# Patient Record
Sex: FEMALE | Race: WHITE | NOT HISPANIC OR LATINO
[De-identification: names, ages, dates, MRNs, and addresses within clinical notes are randomized per-mention and may not be internally consistent; named-entity substitution may affect disease eponyms.]

---

## 2020-11-02 ENCOUNTER — RESULT CHARGE (OUTPATIENT)
Age: 25
End: 2020-11-02

## 2020-11-02 ENCOUNTER — APPOINTMENT (OUTPATIENT)
Dept: PRIMARY CARE | Facility: HOSPITAL | Age: 25
End: 2020-11-02

## 2020-11-02 ENCOUNTER — OUTPATIENT (OUTPATIENT)
Dept: OUTPATIENT SERVICES | Facility: HOSPITAL | Age: 25
LOS: 1 days | End: 2020-11-02

## 2020-11-02 VITALS
SYSTOLIC BLOOD PRESSURE: 101 MMHG | BODY MASS INDEX: 21.26 KG/M2 | TEMPERATURE: 98.2 F | DIASTOLIC BLOOD PRESSURE: 73 MMHG | OXYGEN SATURATION: 100 % | WEIGHT: 120 LBS | HEART RATE: 65 BPM | HEIGHT: 63 IN

## 2020-11-02 LAB
HCG UR QL: NEGATIVE
QUALITY CONTROL: YES

## 2020-11-02 NOTE — HISTORY OF PRESENT ILLNESS
[FreeTextEntry8] : 25 F NKDA with no PMH and no PSH [facility]. \par \par States that she heard the bed alarm and went to the room. She found patient walking to the bathroom holding the bed pan full or urine. Patient accidentally dropped the bed pan on the floor and splashed the urine on her left eye. She immediately flushed her left eye with sterile water > 5 minutes. No itchiness, no eye pain, no blurry vision,no floaters or eye redness. \par \par She works as RN in [employer]. She is updated with her vaccine including Tdap given last July 2020. No chest pain, no chest congestion and not in respiratory distress.

## 2020-11-02 NOTE — HEALTH RISK ASSESSMENT
[No] : No [No falls in past year] : Patient reported no falls in the past year [0] : 2) Feeling down, depressed, or hopeless: Not at all (0) [[id number]] : 0

## 2020-11-09 ENCOUNTER — APPOINTMENT (OUTPATIENT)
Dept: INFECTIOUS DISEASE | Facility: CLINIC | Age: 25
End: 2020-11-09
Payer: COMMERCIAL

## 2020-11-09 VITALS
OXYGEN SATURATION: 100 % | BODY MASS INDEX: 19.49 KG/M2 | SYSTOLIC BLOOD PRESSURE: 100 MMHG | WEIGHT: 110 LBS | HEART RATE: 76 BPM | DIASTOLIC BLOOD PRESSURE: 68 MMHG | TEMPERATURE: 98.4 F | HEIGHT: 63 IN

## 2020-11-09 DIAGNOSIS — Z77.21 CONTACT WITH AND (SUSPECTED) EXPOSURE TO POTENTIALLY HAZARDOUS BODY FLUIDS: ICD-10-CM

## 2020-11-09 DIAGNOSIS — Z29.9 ENCOUNTER FOR PROPHYLACTIC MEASURES, UNSPECIFIED: ICD-10-CM

## 2020-11-09 PROCEDURE — 99072 ADDL SUPL MATRL&STAF TM PHE: CPT

## 2020-11-09 PROCEDURE — 99203 OFFICE O/P NEW LOW 30 MIN: CPT

## 2020-11-09 RX ORDER — RALTEGRAVIR 400 MG/1
400 TABLET, FILM COATED ORAL
Qty: 28 | Refills: 0 | Status: ACTIVE | COMMUNITY
Start: 2020-11-09 | End: 1900-01-01

## 2020-11-09 RX ORDER — EMTRICITABINE AND TENOFOVIR DISOPROXIL FUMARATE 200; 300 MG/1; MG/1
200-300 TABLET, FILM COATED ORAL
Qty: 14 | Refills: 0 | Status: ACTIVE | COMMUNITY
Start: 2020-11-09 | End: 1900-01-01

## 2020-11-09 NOTE — PHYSICAL EXAM
[General Appearance - Alert] : alert [General Appearance - In No Acute Distress] : in no acute distress [Sclera] : the sclera and conjunctiva were normal [Outer Ear] : the ears and nose were normal in appearance [Oropharynx] : the oropharynx was normal with no thrush [Neck Appearance] : the appearance of the neck was normal [] : no respiratory distress [Respiration, Rhythm And Depth] : normal respiratory rhythm and effort [Heart Rate And Rhythm] : heart rate was normal and rhythm regular [Heart Sounds] : normal S1 and S2 [Skin Color & Pigmentation] : normal skin color and pigmentation [No Focal Deficits] : no focal deficits [Oriented To Time, Place, And Person] : oriented to person, place, and time [Affect] : the affect was normal

## 2020-11-09 NOTE — ASSESSMENT
[FreeTextEntry1] : *PEP Management:\par -Rx: Patient given the remaining 2 weeks of the regimen\par -Discussed risks and harms and odds of emmett anything from exposure\par -Discussed safe sex practices until confirmatory test results\par -I discussed follow up appointments going forward\par *Pt to complete 28 day regimen\par *Return 1 week after completion for repeat testing\par *Return at 3 month period and then at the 6 month period\par -All questions answered\par \par *Follow up as directed  [Treatment Education] : treatment education [Risk Reduction] : risk reduction [HIV Education] : HIV Education [Sexuality / Safer Sex] : sexuality/safer sex [Anticipatory Guidance] : anticipatory guidance
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
## 2020-11-16 DIAGNOSIS — Z77.21 CONTACT WITH AND (SUSPECTED) EXPOSURE TO POTENTIALLY HAZARDOUS BODY FLUIDS: ICD-10-CM

## 2021-03-02 ENCOUNTER — EMERGENCY (EMERGENCY)
Facility: HOSPITAL | Age: 26
LOS: 1 days | Discharge: HOME | End: 2021-03-02
Attending: EMERGENCY MEDICINE | Admitting: EMERGENCY MEDICINE
Payer: OTHER MISCELLANEOUS

## 2021-03-02 VITALS
TEMPERATURE: 98 F | SYSTOLIC BLOOD PRESSURE: 108 MMHG | RESPIRATION RATE: 18 BRPM | DIASTOLIC BLOOD PRESSURE: 65 MMHG | OXYGEN SATURATION: 98 % | HEART RATE: 85 BPM

## 2021-03-02 PROCEDURE — 73090 X-RAY EXAM OF FOREARM: CPT | Mod: 26,RT

## 2021-03-02 PROCEDURE — 73060 X-RAY EXAM OF HUMERUS: CPT | Mod: 26,RT

## 2021-03-02 PROCEDURE — 73070 X-RAY EXAM OF ELBOW: CPT | Mod: 26,RT

## 2021-03-02 PROCEDURE — 99284 EMERGENCY DEPT VISIT MOD MDM: CPT
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
## 2021-03-02 NOTE — ED PROVIDER NOTE - NSFOLLOWUPINSTRUCTIONS_ED_ALL_ED_FT
(1) Follow up with your primary care physician as discussed. In addition, we did not find evidence of a life threatening illness on your testing here today, but listed below are the specialists that will be necessary to see as an outpatient to continue the workup.  Please call the numbers listed below or [phone] to set up the necessary appointments.  (2) Immediately seek care at your nearest emergency room if your worsen, persist, or do not resolve   (3) Take Tylenol (up to 1000mg or 1 g)  and/or Motrin (up to 600mg) up to every 6 hours as needed for pain.   (4) If you had an IV (intravenous) line placed, it was removed. Sometimes, after IV removal, that area can be tender for a few days; if it develops redness and swelling, those could be signs of infection; in which case, return to the Emergency Department for assessment. Follow up with your healthcare provider about this issue.
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
## 2021-03-02 NOTE — ED PROVIDER NOTE - PATIENT PORTAL LINK FT
You can access the FollowMyHealth Patient Portal offered by [facility] by registering at the following website: [email or web address]. By joining [facility]’s FollowMyHealth portal, you will also be able to view your health information using other applications (apps) compatible with our system.

## 2021-03-31 ENCOUNTER — APPOINTMENT (OUTPATIENT)
Dept: INTERNAL MEDICINE | Facility: CLINIC | Age: 26
End: 2021-03-31

## 2021-05-05 NOTE — ED PROVIDER NOTE - NS ED ATTENDING STATEMENT MOD
Therapy change made today includes:    Lifestyle Modification Addressed:   Aim for 20 to 30 minutes of exercise 3 to 5 days per week:  Try chair exercises and light weight lifting with dumb bells or soup cans  Limit carbohydrate intake to 45 grams per meal and 15 grams per snack  No sugar sweetened beverages       Discussed Jardiance as it may help with weight loss. Will send message to PCP.    ------------------------------------------------------------------------------------------  The patient was seen for Diabetes Self-Management Education in an individual setting for a diagnosis of diabetes. Time spent with patient was 60 minutes.  Pt referred by Zuri Diaz MD.  Order located in the patient's computer chart.  Reason for Visit: Initial Diabetes Education.  Relevant medical history reviewed.    Pt is at office visit today by self.    Preferences for learning: Visual, repetition, verbal, observation, reading, and demonstration are acceptable to the patient.   Material was presented using verbal, written, demonstration and return demonstration.      Verify assessment form is up to date: completed and reviewed at consult 5/5/21    Latex allergy: No known latex allergy  ------------------------------------------------------------------------------------------------------------  Labs    Hemoglobin A1C: target value and patient current value reviewed 6.9% 2/8/21    Weight  Discussed importance of weight loss with patient for Diabetes management  BMI: Estimated body mass index is 41.12 kg/m² as calculated from the following:    Height as of 4/28/21: 5' 9.5\" (1.765 m).    Weight as of 4/28/21: 128.1 kg.      Blood Glucose Monitoring    Pt is not monitoring and chooses not to begin today.  Advised if A1C goes any higher she must start testing. Pt agrees.     Diabetes Medications  verified  Oral Medications are Glipizide 5 mg 1 tab daily UNSURE if taken as instructed.  Pt states she picked it up, but doesn't think  she has started it yet.      Physical Activity - Incorporating Activity into Lifestyle   Nothing purposeful  OOB with any cardio  Advised seated cair exercises and weight lifting    Food and Nutrition Related History  Pt works: 12:30 pm- 9 pm, M-F  May eat only 1x on weekends  Meal  Content   1st meal Time: 9 am  Food choice: cereal or bonds or oatmeal or rice and eggs  Beverage: [place] tea, ginger ale, water, [place]k almond milk   2nd Meal Time: 2 pm  Food choice: Yest: pork and beans and hot dogs  Beverage: same   3rd Meal Time: 9 am  Food choice: Tater tots or PB and J sandwich or chips  Baked chicken or pork steaks and mixed veggies  Beverage: same; likely Ginger Ale   Snacks/Desserts AM:   PM: sunflower seeds, chips, mini candy bars  Evening: none     Oral Fluids   ETOH: none   Advised NO SUGAR SWEETENED BEVERAGES     Reviewed effects of food on blood sugars  Discussed benefits of balancing meals  Reviewed plate method  Reviewed basic carbohydrate counting  Reviewed label reading    Acute and Chronic Complications  Advised on the potential complications related to uncontrolled diabetes     Other Patient Information  Pt states she had Covid and has memory loss since then.     Discussed readiness to make changes with patient  Patient states:  she/he is ready to make change now   ------------------------------------------------------------------------------------------------------------  Written materials provided were: Diabetes In Control booklet   [employer] Program Handout  Daily Meal Planning Guide  Personal Diabetes Care Record card  Diabetes Daily Record book  Schedule for Diabetes Support Groups  A1C Guide  Pia: Long-Term Complications of Diabetes  Meal Planning - Plate Method     ADA Advice for Emergency Situations  FYWB Insulin Resistance  Planning Healthy Meals  Chair exercises  After Visit Summary (AVS)    Goal Setting to Promote Health & Problem Solving for Daily Living was discussed.    See DM  Care Plan for goals.    Plan:  Report sent to referring provider   Pt to follow up with DM education in a one on one visit.                 Attending with

## 2021-10-04 ENCOUNTER — TRANSCRIPTION ENCOUNTER (OUTPATIENT)
Age: 26
End: 2021-10-04

## 2021-10-04 ENCOUNTER — APPOINTMENT (OUTPATIENT)
Dept: PRIMARY CARE | Facility: HOSPITAL | Age: 26
End: 2021-10-04

## 2021-10-04 ENCOUNTER — OUTPATIENT (OUTPATIENT)
Dept: OUTPATIENT SERVICES | Facility: HOSPITAL | Age: 26
LOS: 1 days | End: 2021-10-04

## 2021-10-04 DIAGNOSIS — J02.9 ACUTE PHARYNGITIS, UNSPECIFIED: ICD-10-CM

## 2021-10-04 PROBLEM — Z78.9 OTHER SPECIFIED HEALTH STATUS: Chronic | Status: ACTIVE | Noted: 2021-03-02

## 2021-10-04 NOTE — PHYSICAL EXAM
[No Acute Distress] : no acute distress [Normal Sclera/Conjunctiva] : normal sclera/conjunctiva [Normal Outer Ear/Nose] : the outer ears and nose were normal in appearance [No Respiratory Distress] : no respiratory distress  [No Accessory Muscle Use] : no accessory muscle use [Clear to Auscultation] : lungs were clear to auscultation bilaterally [Normal Rate] : normal rate  [Regular Rhythm] : with a regular rhythm [No Focal Deficits] : no focal deficits [Normal Gait] : normal gait [Normal Affect] : the affect was normal [Normal Insight/Judgement] : insight and judgment were intact [de-identified] : Rapid Strep was Negative, no tonsular exudates, no tonsular swelling and no tonsular erythema, no maxillary tenderness  [de-identified] : no wheezing, no rhonchi and no crackles

## 2021-10-04 NOTE — HISTORY OF PRESENT ILLNESS
[FreeTextEntry8] : 26F NKDA with no PMH and no PSH [facility] due to complaint of sore throat. \par \par States that she started having sore throat, nasal congestion , dry cough since this morning, 10/4/21. Described as itchy, scratchy associated with dry cough (no mucous or post nasal drip). She came from [place] last week and never had any symptoms since she came back until now. She is fully vaccinated with COVID vaccine since February 2021.   Denies any fever or SOB. No loss of smell or taste, no chest tightness, or any GI related symptoms of nausea, vomiting or diarrhea. \par \par She works as RN at [employer]. She does not take regular maintenance medications. Denies any chest pain, no chest palpitations or any respiratory distress\par \par \par

## 2021-10-05 DIAGNOSIS — J02.9 ACUTE PHARYNGITIS, UNSPECIFIED: ICD-10-CM

## 2021-10-05 LAB — SARS-COV-2 N GENE NPH QL NAA+PROBE: NOT DETECTED

## 2021-10-07 ENCOUNTER — TRANSCRIPTION ENCOUNTER (OUTPATIENT)
Age: 26
End: 2021-10-07

## 2021-10-07 LAB — BACTERIA THROAT CULT: NORMAL

## 2022-07-12 ENCOUNTER — NON-APPOINTMENT (OUTPATIENT)
Age: 27
End: 2022-07-12

## 2023-05-02 ENCOUNTER — APPOINTMENT (OUTPATIENT)
Dept: INTERNAL MEDICINE | Facility: CLINIC | Age: 28
End: 2023-05-02
Payer: COMMERCIAL

## 2023-05-02 VITALS
WEIGHT: 117 LBS | TEMPERATURE: 98.2 F | SYSTOLIC BLOOD PRESSURE: 108 MMHG | BODY MASS INDEX: 20.73 KG/M2 | DIASTOLIC BLOOD PRESSURE: 73 MMHG | HEART RATE: 56 BPM | OXYGEN SATURATION: 99 % | HEIGHT: 63 IN

## 2023-05-02 DIAGNOSIS — Z00.00 ENCOUNTER FOR GENERAL ADULT MEDICAL EXAMINATION W/OUT ABNORMAL FINDINGS: ICD-10-CM

## 2023-05-02 DIAGNOSIS — R53.83 OTHER MALAISE: ICD-10-CM

## 2023-05-02 DIAGNOSIS — R53.81 OTHER MALAISE: ICD-10-CM

## 2023-05-02 DIAGNOSIS — R14.0 ABDOMINAL DISTENSION (GASEOUS): ICD-10-CM

## 2023-05-02 PROCEDURE — 36415 COLL VENOUS BLD VENIPUNCTURE: CPT

## 2023-05-02 PROCEDURE — 99385 PREV VISIT NEW AGE 18-39: CPT | Mod: 25

## 2023-05-02 PROCEDURE — 99213 OFFICE O/P EST LOW 20 MIN: CPT | Mod: 25

## 2023-05-02 NOTE — PHYSICAL EXAM
[de-identified] : Gen: Adult F, NAD\par Head: NC/AT\par EENT: ears grossly normal, PERRL, EOMI, moist mucosa\par Neck: supple\par Chest wall: nontender\par CV: normal s1 +s2, rrr, no murmurs\par Pulm: CTA-B\par Abd: soft, NT, ND\par Skin: no rashes\par Back: no CVA tenderness, no spinal tenderness\par Neuro: gait normal, AAOx3\par Psych: normal affect, normal interaction\par

## 2023-05-02 NOTE — ASSESSMENT
[FreeTextEntry1] : 28yo F seen for CPE\par \par bloating - ?food related\par                          rec food log + meet with nutritionist\par                          before considering eliminating specific foods\par                          rec H pylori check\par fatigue - possibly sleep disorder, hx of mono years ago so post viral syndrome\par               otherwsie it is unclear the cause, she exercises, drinks water, does not work at nights\par CPE today\par Labs today - nonfasting\par UTD with flu shot + covid19 vaccine\par rec ophtho\par sees dental\par rec derm\par sees GYN\par \par

## 2023-05-02 NOTE — HISTORY OF PRESENT ILLNESS
[FreeTextEntry1] : CPE [de-identified] : 28yo F seen for CPE\par today she wants to discuss the following:\par            fatigue -- B12 is low and bloating\par meds: nuvaRIng, B12\par Fam hx: mother \par                father - HLD, heart attack\par                 Pat-GM - ovarian cancer\par                  2 sibings - healthy\par \par eating: food from home more\par eggs/oatmeal\par salad or sandwich\par 1 cup of coffee/occasional alcohol/ no cigarettes\par physical activity - 3x/week- cardio and weight training\par \par sees GYN/dentist/ has not eyes checked\par skin check in the past\par \par covid19 x3\par ill with covid19 in OCt 2021 - unable to smell for one month\par \par denies cp, sob, n/v, diarrhea, no joint pain \par \par Remainder of ROS reviewed and found to be negative.\par

## 2023-05-05 LAB
ALBUMIN SERPL ELPH-MCNC: 4.4 G/DL
ALP BLD-CCNC: 63 U/L
ALT SERPL-CCNC: 15 U/L
ANION GAP SERPL CALC-SCNC: 13 MMOL/L
AST SERPL-CCNC: 19 U/L
BASOPHILS # BLD AUTO: 0.06 K/UL
BASOPHILS NFR BLD AUTO: 0.7 %
BILIRUB SERPL-MCNC: 0.4 MG/DL
BUN SERPL-MCNC: 10 MG/DL
CALCIUM SERPL-MCNC: 9.3 MG/DL
CHLORIDE SERPL-SCNC: 103 MMOL/L
CHOLEST SERPL-MCNC: 219 MG/DL
CO2 SERPL-SCNC: 22 MMOL/L
CREAT SERPL-MCNC: 0.68 MG/DL
CRP SERPL-MCNC: 9 MG/L
DSDNA AB SER-ACNC: <12 IU/ML
EGFR: 123 ML/MIN/1.73M2
EOSINOPHIL # BLD AUTO: 0.07 K/UL
EOSINOPHIL NFR BLD AUTO: 0.8 %
ERYTHROCYTE [SEDIMENTATION RATE] IN BLOOD BY WESTERGREN METHOD: 22 MM/HR
ESTIMATED AVERAGE GLUCOSE: 108 MG/DL
GLUCOSE SERPL-MCNC: 77 MG/DL
HBA1C MFR BLD HPLC: 5.4 %
HCT VFR BLD CALC: 40.6 %
HCYS SERPL-MCNC: 4.1 UMOL/L
HDLC SERPL-MCNC: 73 MG/DL
HGB BLD-MCNC: 12.2 G/DL
IMM GRANULOCYTES NFR BLD AUTO: 0.2 %
LDLC SERPL CALC-MCNC: 128 MG/DL
LYMPHOCYTES # BLD AUTO: 2.64 K/UL
LYMPHOCYTES NFR BLD AUTO: 31.4 %
MAN DIFF?: NORMAL
MCHC RBC-ENTMCNC: 22.1 PG
MCHC RBC-ENTMCNC: 30 GM/DL
MCV RBC AUTO: 73.6 FL
MONOCYTES # BLD AUTO: 0.41 K/UL
MONOCYTES NFR BLD AUTO: 4.9 %
NEUTROPHILS # BLD AUTO: 5.21 K/UL
NEUTROPHILS NFR BLD AUTO: 62 %
NONHDLC SERPL-MCNC: 146 MG/DL
PLATELET # BLD AUTO: 254 K/UL
POTASSIUM SERPL-SCNC: 3.8 MMOL/L
PROT SERPL-MCNC: 7.2 G/DL
RBC # BLD: 5.52 M/UL
RBC # FLD: 15.2 %
SODIUM SERPL-SCNC: 137 MMOL/L
TRIGL SERPL-MCNC: 91 MG/DL
TSH SERPL-ACNC: 1.05 UIU/ML
VIT B12 SERPL-MCNC: 731 PG/ML
WBC # FLD AUTO: 8.41 K/UL

## 2023-06-06 LAB
HGB A MFR BLD: 96.2 %
HGB A2 MFR BLD: 3.8 %
HGB FRACT BLD-IMP: NORMAL
METHYLMALONATE SERPL-SCNC: 100 NMOL/L

## 2023-09-06 ENCOUNTER — NON-APPOINTMENT (OUTPATIENT)
Age: 28
End: 2023-09-06

## 2024-02-21 ENCOUNTER — NON-APPOINTMENT (OUTPATIENT)
Age: 29
End: 2024-02-21

## 2024-06-06 ENCOUNTER — NON-APPOINTMENT (OUTPATIENT)
Age: 29
End: 2024-06-06

## 2024-08-19 ENCOUNTER — APPOINTMENT (OUTPATIENT)
Dept: INTERNAL MEDICINE | Facility: CLINIC | Age: 29
End: 2024-08-19

## 2024-08-19 ENCOUNTER — NON-APPOINTMENT (OUTPATIENT)
Age: 29
End: 2024-08-19

## 2024-08-19 VITALS
OXYGEN SATURATION: 99 % | TEMPERATURE: 97.8 F | HEIGHT: 63 IN | DIASTOLIC BLOOD PRESSURE: 69 MMHG | WEIGHT: 124 LBS | SYSTOLIC BLOOD PRESSURE: 102 MMHG | BODY MASS INDEX: 21.97 KG/M2 | RESPIRATION RATE: 16 BRPM | HEART RATE: 57 BPM

## 2024-08-19 DIAGNOSIS — Z78.9 OTHER SPECIFIED HEALTH STATUS: ICD-10-CM

## 2024-08-19 DIAGNOSIS — E78.00 PURE HYPERCHOLESTEROLEMIA, UNSPECIFIED: ICD-10-CM

## 2024-08-19 DIAGNOSIS — R53.83 OTHER MALAISE: ICD-10-CM

## 2024-08-19 DIAGNOSIS — Z82.3 FAMILY HISTORY OF STROKE: ICD-10-CM

## 2024-08-19 DIAGNOSIS — Z00.00 ENCOUNTER FOR GENERAL ADULT MEDICAL EXAMINATION W/OUT ABNORMAL FINDINGS: ICD-10-CM

## 2024-08-19 DIAGNOSIS — R53.81 OTHER MALAISE: ICD-10-CM

## 2024-08-19 DIAGNOSIS — R79.89 OTHER SPECIFIED ABNORMAL FINDINGS OF BLOOD CHEMISTRY: ICD-10-CM

## 2024-08-19 DIAGNOSIS — D56.3 THALASSEMIA MINOR: ICD-10-CM

## 2024-08-19 DIAGNOSIS — Z80.41 FAMILY HISTORY OF MALIGNANT NEOPLASM OF OVARY: ICD-10-CM

## 2024-08-19 DIAGNOSIS — Z13.6 ENCOUNTER FOR SCREENING FOR CARDIOVASCULAR DISORDERS: ICD-10-CM

## 2024-08-19 LAB — CYTOLOGY CVX/VAG DOC THIN PREP: NORMAL

## 2024-08-19 PROCEDURE — 99385 PREV VISIT NEW AGE 18-39: CPT

## 2024-08-19 PROCEDURE — 99204 OFFICE O/P NEW MOD 45 MIN: CPT

## 2024-08-19 PROCEDURE — 93000 ELECTROCARDIOGRAM COMPLETE: CPT

## 2024-08-20 ENCOUNTER — NON-APPOINTMENT (OUTPATIENT)
Age: 29
End: 2024-08-20

## 2024-08-21 LAB
ALBUMIN SERPL ELPH-MCNC: 4.2 G/DL
ALP BLD-CCNC: 75 U/L
ALT SERPL-CCNC: 14 U/L
ANION GAP SERPL CALC-SCNC: 11 MMOL/L
APPEARANCE: CLEAR
AST SERPL-CCNC: 18 U/L
BASOPHILS # BLD AUTO: 0.05 K/UL
BASOPHILS NFR BLD AUTO: 0.9 %
BILIRUB SERPL-MCNC: 0.5 MG/DL
BILIRUBIN URINE: NEGATIVE
BLOOD URINE: NEGATIVE
BUN SERPL-MCNC: 9 MG/DL
CALCIUM SERPL-MCNC: 9.2 MG/DL
CHLORIDE SERPL-SCNC: 106 MMOL/L
CHOLEST SERPL-MCNC: 215 MG/DL
CO2 SERPL-SCNC: 24 MMOL/L
COLOR: YELLOW
CREAT SERPL-MCNC: 0.63 MG/DL
EGFR: 124 ML/MIN/1.73M2
EOSINOPHIL # BLD AUTO: 0.16 K/UL
EOSINOPHIL NFR BLD AUTO: 2.8 %
ESTIMATED AVERAGE GLUCOSE: 108 MG/DL
FOLATE SERPL-MCNC: 11.8 NG/ML
GLUCOSE QUALITATIVE U: NEGATIVE MG/DL
GLUCOSE SERPL-MCNC: 86 MG/DL
HBA1C MFR BLD HPLC: 5.4 %
HCT VFR BLD CALC: 40.5 %
HDLC SERPL-MCNC: 65 MG/DL
HGB BLD-MCNC: 11.7 G/DL
IMM GRANULOCYTES NFR BLD AUTO: 0.2 %
KETONES URINE: NEGATIVE MG/DL
LDLC SERPL CALC-MCNC: 137 MG/DL
LEUKOCYTE ESTERASE URINE: NEGATIVE
LYMPHOCYTES # BLD AUTO: 2.36 K/UL
LYMPHOCYTES NFR BLD AUTO: 41.1 %
MAN DIFF?: NORMAL
MCHC RBC-ENTMCNC: 21.5 PG
MCHC RBC-ENTMCNC: 28.9 GM/DL
MCV RBC AUTO: 74.3 FL
MONOCYTES # BLD AUTO: 0.43 K/UL
MONOCYTES NFR BLD AUTO: 7.5 %
NEUTROPHILS # BLD AUTO: 2.73 K/UL
NEUTROPHILS NFR BLD AUTO: 47.5 %
NITRITE URINE: NEGATIVE
NONHDLC SERPL-MCNC: 150 MG/DL
PH URINE: 6.5
PLATELET # BLD AUTO: 224 K/UL
POTASSIUM SERPL-SCNC: 4.2 MMOL/L
PROT SERPL-MCNC: 6.9 G/DL
PROTEIN URINE: NEGATIVE MG/DL
RBC # BLD: 5.45 M/UL
RBC # FLD: 15.3 %
SODIUM SERPL-SCNC: 140 MMOL/L
SPECIFIC GRAVITY URINE: 1.01
TRIGL SERPL-MCNC: 74 MG/DL
TSH SERPL-ACNC: 1.58 UIU/ML
UROBILINOGEN URINE: 0.2 MG/DL
VIT B12 SERPL-MCNC: 1854 PG/ML
WBC # FLD AUTO: 5.74 K/UL

## 2024-08-29 NOTE — ASSESSMENT
[FreeTextEntry1] : , ,  Preventative: Counseled on health promotion and disease prevention. EKG and wellness labs done Follow-up with OBGYN for Annual Well woman exam / Pap   Heart Screen:  EKG nsr  Beta Thal Trait: Check Hb monitoring.  Fatigue: check labs consider sleep studies  Chol Elevated: LDL worsened 128-->137 Counseled on diet, exercise and lifestyle modifications. Advised a diet centered around whole plant based foods.  Vegetables, fruits, legumes, grains, nuts.  Advised limiting intake of refined processed foods (refined white flour products, refined sugar, soda, juice).  Limit intake of meat, dairy, eggs, oil.  Low B12: supplementation     
[FreeTextEntry1] : , ,  Preventative: Counseled on health promotion and disease prevention. EKG and wellness labs done Follow-up with OBGYN for Annual Well woman exam / Pap   Heart Screen:  EKG nsr  Beta Thal Trait: Check Hb monitoring.  Fatigue: check labs consider sleep studies  Chol Elevated: LDL worsened 128-->137 Counseled on diet, exercise and lifestyle modifications. Advised a diet centered around whole plant based foods.  Vegetables, fruits, legumes, grains, nuts.  Advised limiting intake of refined processed foods (refined white flour products, refined sugar, soda, juice).  Limit intake of meat, dairy, eggs, oil.  Low B12: supplementation     
Yes

## 2024-09-13 ENCOUNTER — APPOINTMENT (OUTPATIENT)
Dept: INTERNAL MEDICINE | Facility: CLINIC | Age: 29
End: 2024-09-13